# Patient Record
Sex: FEMALE | NOT HISPANIC OR LATINO
[De-identification: names, ages, dates, MRNs, and addresses within clinical notes are randomized per-mention and may not be internally consistent; named-entity substitution may affect disease eponyms.]

---

## 2020-11-03 PROBLEM — Z00.00 ENCOUNTER FOR PREVENTIVE HEALTH EXAMINATION: Status: ACTIVE | Noted: 2020-11-03

## 2020-11-04 ENCOUNTER — APPOINTMENT (OUTPATIENT)
Dept: NEUROLOGY | Facility: CLINIC | Age: 40
End: 2020-11-04
Payer: COMMERCIAL

## 2020-11-04 VITALS
DIASTOLIC BLOOD PRESSURE: 71 MMHG | WEIGHT: 150 LBS | SYSTOLIC BLOOD PRESSURE: 105 MMHG | HEIGHT: 67 IN | BODY MASS INDEX: 23.54 KG/M2 | TEMPERATURE: 97.6 F | HEART RATE: 90 BPM

## 2020-11-04 DIAGNOSIS — Z80.3 FAMILY HISTORY OF MALIGNANT NEOPLASM OF BREAST: ICD-10-CM

## 2020-11-04 DIAGNOSIS — Z78.9 OTHER SPECIFIED HEALTH STATUS: ICD-10-CM

## 2020-11-04 DIAGNOSIS — Z80.0 FAMILY HISTORY OF MALIGNANT NEOPLASM OF DIGESTIVE ORGANS: ICD-10-CM

## 2020-11-04 PROCEDURE — 99072 ADDL SUPL MATRL&STAF TM PHE: CPT

## 2020-11-04 PROCEDURE — 99204 OFFICE O/P NEW MOD 45 MIN: CPT

## 2020-11-05 PROBLEM — Z80.3 FAMILY HISTORY OF MALIGNANT NEOPLASM OF BREAST: Status: ACTIVE | Noted: 2020-11-05

## 2020-11-05 PROBLEM — Z78.9 CURRENT NON-DRINKER OF ALCOHOL: Status: ACTIVE | Noted: 2020-11-05

## 2020-11-05 PROBLEM — Z80.0 FAMILY HISTORY OF PANCREATIC CANCER: Status: ACTIVE | Noted: 2020-11-05

## 2020-11-05 PROBLEM — Z78.9 NON-SMOKER: Status: ACTIVE | Noted: 2020-11-05

## 2020-11-05 NOTE — HISTORY OF PRESENT ILLNESS
[FreeTextEntry1] : This is a 40-year-old woman who is being seen in neurologic consultation for evaluation of headaches. She has had severe headaches for about 10 years. She describes a typically bilateral discomfort which is most prominent in her for head in between the eyes and the nose. She does have light sensitivity and sound sensitivity. There's no nausea or vomiting. She will notice her neck tightening and spasming sometimes with the headache. There is no aura or warning symptoms. She notes stress, arguing and yelling will trigger her headaches. When the headache starts it can last for hours to the entire day. She has tried Advil, Tylenol, and Aleve. Excedrin works the best for her and she takes 6-8 Excedrin tablets at least per week. She has 3 headaches like this per week. She does not have any excessive amounts of stress in her life. 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 She sleeps well. She does not drink enough water. She drinks one to 2 cups of caffeine.

## 2020-11-05 NOTE — PHYSICAL EXAM
[FreeTextEntry1] : Physical examination \par General: No acute distress, Awake, Alert. \par Fundus: Unable\par Neck: no Carotid bruit. \par Cardiovascular: Normal rate, Regular rhythm, No murmur. \par Chest - clear bilaterally\par \par Mental status \par Awake, alert, and oriented to person, time and place, Normal attention span and concentration, Recent and remote memory intact, Language intact, Fund of knowledge intact. \par Cranial Nerves \par II: VFF \par III, IV, VI: PERRL, EOMI. \par V: Facial sensation is normal B/L. \par VII: Facial strength is normal B/L. \par VIII: Gross hearing is intact. \par IX, X: Palate is midline and elevates symmetrically. \par XI: Trapezius normal strength. \par XII: Tongue midline without atrophy or fasciculations. \par \par Motor exam \par Muscle tone - no evidence of rigidity or resistance in all 4 extremities. \par No atrophy or fasciculations \par Muscle Strength: arms and legs, proximal and distal flexors and extensors are normal \par No UE drift.\par \par Reflexes \par All present, normal, and symmetrical. \par Plantars right: mute. \par Plantars left: mute. \par \par Coordination \par Finger to nose: Normal. \par Heel to shin: Normal. \par \par Sensory \par Intact sensation to vibration and cold.\par \par Gait \par Normal\par \par positive trapezius muscle spasm bilaterally.\par

## 2020-11-05 NOTE — ASSESSMENT
[FreeTextEntry1] : Neurologic examination is normal. Still with this long history of headaches and worsening headaches she needs imaging as outlined below.\par She'll keep a headache diary.\par Encouraged intake of water to 64 ounces daily.\par She will begin Cymbalta 30 mg daily for one week with increased to 60 mg daily.\par Followup in 10 weeks.

## 2020-11-27 ENCOUNTER — RESULT REVIEW (OUTPATIENT)
Age: 40
End: 2020-11-27

## 2020-12-14 ENCOUNTER — NON-APPOINTMENT (OUTPATIENT)
Age: 40
End: 2020-12-14

## 2021-01-11 ENCOUNTER — APPOINTMENT (OUTPATIENT)
Dept: NEUROLOGY | Facility: CLINIC | Age: 41
End: 2021-01-11
Payer: COMMERCIAL

## 2021-01-11 VITALS
HEART RATE: 78 BPM | WEIGHT: 154 LBS | SYSTOLIC BLOOD PRESSURE: 123 MMHG | DIASTOLIC BLOOD PRESSURE: 83 MMHG | TEMPERATURE: 97.2 F | HEIGHT: 67 IN | BODY MASS INDEX: 24.17 KG/M2

## 2021-01-11 DIAGNOSIS — G44.40 DRUG-INDUCED HEADACHE, NOT ELSEWHERE CLASSIFIED, NOT INTRACTABLE: ICD-10-CM

## 2021-01-11 PROCEDURE — 99214 OFFICE O/P EST MOD 30 MIN: CPT

## 2021-01-11 PROCEDURE — 99072 ADDL SUPL MATRL&STAF TM PHE: CPT

## 2021-01-12 PROBLEM — G44.40 MEDICATION OVERUSE HEADACHE: Status: ACTIVE | Noted: 2020-11-04

## 2021-01-12 RX ORDER — NORGESTIMATE AND ETHINYL ESTRADIOL 7DAYSX3 28
0.18/0.215/0.25 KIT ORAL
Qty: 28 | Refills: 0 | Status: ACTIVE | COMMUNITY
Start: 2020-12-17

## 2021-01-12 NOTE — PHYSICAL EXAM
[FreeTextEntry1] : Physical examination \par General: No acute distress, Awake, Alert. \par Cardiovascular: Normal rate, Regular rhythm, No murmur. \par Chest - clear bilaterally\par \par Mental status \par Awake, alert, and oriented to person, time and place, Normal attention span and concentration, Recent and remote memory intact, Language intact, Fund of knowledge intact. \par Cranial Nerves \par II: VFF \par III, IV, VI: PERRL, EOMI. \par V: Facial sensation is normal B/L. \par VII: Facial strength is normal B/L. \par VIII: Gross hearing is intact. \par IX, X: Palate is midline and elevates symmetrically. \par XI: Trapezius normal strength. \par XII: Tongue midline without atrophy or fasciculations. \par \par Motor exam \par Muscle tone - no evidence of rigidity or resistance in all 4 extremities. \par No atrophy or fasciculations \par Muscle Strength: arms and legs, proximal and distal flexors and extensors are normal \par No UE drift.\par \par Coordination \par Finger to nose: Normal. \par Heel to shin: Normal. \par \par Gait \par Normal\par \par positive trapezius muscle spasm bilaterally.\par

## 2021-01-12 NOTE — HISTORY OF PRESENT ILLNESS
[FreeTextEntry1] : 1/11/21\par Patient continues to have headaches. She has not been consistently taking her Cymbalta. She says that when she started taking Cymbalta she was missing a lot of doses and because she wasn't improving and still haven't her leg etc. and she thought it wasn't working. However she has not really given it a full chance.\hans Continues to have tightening and spasming of her neck with the headaches. Stressful trigger her headaches. She feels she is taking about 2 etc. his per week but before she had noted 6-8 Excedrin tablets per week.\hans Notes a lot of headaches around her periods. She is on oral contraceptives.\par \par 11/4/2020\par This is a 40-year-old woman who is being seen in neurologic consultation for evaluation of headaches. She has had severe headaches for about 10 years. She describes a typically bilateral discomfort which is most prominent in her for head in between the eyes and the nose. She does have light sensitivity and sound sensitivity. There's no nausea or vomiting. She will notice her neck tightening and spasming sometimes with the headache. There is no aura or warning symptoms. She notes stress, arguing and yelling will trigger her headaches. When the headache starts it can last for hours to the entire day. She has tried Advil, Tylenol, and Aleve. Excedrin works the best for her and she takes 6-8 Excedrin tablets at least per week. She has 3 headaches like this per week. She does not have any excessive amounts of stress in her life. 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 She sleeps well. She does not drink enough water. She drinks one to 2 cups of caffeine.

## 2021-04-29 ENCOUNTER — APPOINTMENT (OUTPATIENT)
Dept: NEUROLOGY | Facility: CLINIC | Age: 41
End: 2021-04-29
Payer: COMMERCIAL

## 2021-04-29 VITALS
BODY MASS INDEX: 22.76 KG/M2 | HEIGHT: 67 IN | SYSTOLIC BLOOD PRESSURE: 116 MMHG | TEMPERATURE: 97.2 F | DIASTOLIC BLOOD PRESSURE: 79 MMHG | WEIGHT: 145 LBS | HEART RATE: 91 BPM

## 2021-04-29 DIAGNOSIS — G44.221 CHRONIC TENSION-TYPE HEADACHE, INTRACTABLE: ICD-10-CM

## 2021-04-29 PROCEDURE — 99072 ADDL SUPL MATRL&STAF TM PHE: CPT

## 2021-04-29 PROCEDURE — 99213 OFFICE O/P EST LOW 20 MIN: CPT

## 2021-04-29 RX ORDER — DULOXETINE HYDROCHLORIDE 60 MG/1
60 CAPSULE, DELAYED RELEASE PELLETS ORAL
Qty: 30 | Refills: 3 | Status: ACTIVE | COMMUNITY
Start: 2020-11-05 | End: 1900-01-01

## 2021-04-29 NOTE — PHYSICAL EXAM
[FreeTextEntry1] : Physical examination \par General: No acute distress, Awake, Alert. \par Cardiovascular: Normal rate, Regular rhythm, No murmur. \par Chest - clear bilaterally\par \par Mental status \par Awake, alert, and oriented to person, time and place, Normal attention span and concentration, Recent and remote memory intact, Language intact, Fund of knowledge intact. \par Cranial Nerves \par II: VFF \par III, IV, VI: PERRL, EOMI. \par V: Facial sensation is normal B/L. \par VII: Facial strength is normal B/L. \par VIII: Gross hearing is intact. \par IX, X: Palate is midline and elevates symmetrically. \par XI: Trapezius normal strength. \par XII: Tongue midline without atrophy or fasciculations. \par \par Motor exam \par Muscle tone - no evidence of rigidity or resistance in all 4 extremities. \par No atrophy or fasciculations \par Muscle Strength: arms and legs, proximal and distal flexors and extensors are normal \par No UE drift.\par \par Gait \par Normal\par \par positive trapezius muscle spasm bilaterally.\par

## 2021-04-29 NOTE — ASSESSMENT
[FreeTextEntry1] : Kathy notes significant improvement in her headaches.  The last headache she had was about 2 weeks ago.  This headache occurred after she received her Covid vaccination.  She used Excedrin tension headache and this resolved.  She is noticing improvement in her muscle spasms and neck pain.  She still has stiffness in her neck on occasion.  No focal or lateralizing neurologic discomfort.  She is due for her second vaccine next week.  She is tolerating Cymbalta without any side effects.  She is compliant with it now.\par \par She is going to discuss IUD with her gynecologist- is not taking oral contraceptive at present.\par \par f/u in 4 months.

## 2021-04-29 NOTE — HISTORY OF PRESENT ILLNESS
[FreeTextEntry1] : 4/29/21\par Kathy notes significant improvement in her headaches.  The last headache she had was about 2 weeks ago.  This headache occurred after she received her Covid vaccination.  She used Excedrin tension headache and this resolved.  She is noticing improvement in her muscle spasms and neck pain.  She still has stiffness in her neck on occasion.  No focal or lateralizing neurologic discomfort.  She is due for her second vaccine next week.  She is tolerating Cymbalta without any side effects.  She is compliant with it now.\par \par 1/11/21\par Patient continues to have headaches. She has not been consistently taking her Cymbalta. She says that when she started taking Cymbalta she was missing a lot of doses and because she wasn't improving and still haven't her leg etc. and she thought it wasn't working. However she has not really given it a full chance.\hans Continues to have tightening and spasming of her neck with the headaches. Stressful trigger her headaches. She feels she is taking about 2 etc. his per week but before she had noted 6-8 Excedrin tablets per week.\par Notes a lot of headaches around her periods. She is on oral contraceptives.\par \par 11/4/2020\par This is a 40-year-old woman who is being seen in neurologic consultation for evaluation of headaches. She has had severe headaches for about 10 years. She describes a typically bilateral discomfort which is most prominent in her for head in between the eyes and the nose. She does have light sensitivity and sound sensitivity. There's no nausea or vomiting. She will notice her neck tightening and spasming sometimes with the headache. There is no aura or warning symptoms. She notes stress, arguing and yelling will trigger her headaches. When the headache starts it can last for hours to the entire day. She has tried Advil, Tylenol, and Aleve. Excedrin works the best for her and she takes 6-8 Excedrin tablets at least per week. She has 3 headaches like this per week. She does not have any excessive amounts of stress in her life. 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 She sleeps well. She does not drink enough water. She drinks one to 2 cups of caffeine.

## 2021-04-29 NOTE — DATA REVIEWED
[de-identified] : \par  [place] MRI Report             Final\par \par No Documents Attached\par \par \par \par \par   [facility]\par                                          [address]\par                                    [place], [place]  [address]\par                                        Department of Radiology\par                                             [phone]\par \par \par Patient Name:      BROCK FERNÁNDEZ                  Location:       [facility]\par Med Rec #:        [id number]                    Account #:      [id number]\par YOB: 1980                    Ordering:       Heather Aponte MD\par Age: 40               Sex:    F                 Attending:      Heather Aponte MD\par PCP:        NO PRIMARY CARE PHYSICIAN\par ______________________________________________________________________________________\par \par Exam Date:      11/27/20\par Exam:         MRI BRAIN\par Order#:       [id number]\par \par \par \par EXAM: MRI brain without contrast\par \par  INDICATION: Headache of long duration\par \par  TECHNIQUE: MR examination of brain performed without contrast utilizing axial diffusion-\par weighted/ADC, axial T2 FLAIR, sagittal T2 FLAIR, coronal T2 FLAIR, axial T1, sagittal T1, coronal\par T1, axial susceptibility weighted sequences.\par \par  COMPARISON: None available\par \par  FINDINGS:\par \par  Ventricles are normal in size and configuration for patient's age. No hydrocephalus or extra-axial\par fluid collection.\par \par  No abnormal restricted diffusion identified to suggest acute territorial infarction. No acute\par intracranial hemorrhage. Few scattered foci of subcortical white matter T2 hyperintense signal are\par noted which appear to have a frontal lobe predilection and are nonspecific in this age demographic\par and may reflect sequela of chronic migraine versus sequela minimal chronic microvascular ischemia\par versus gliosis of other etiologies. No significant midline shift, mass effect or herniation.\par Susceptibility weighted sequences are within normal limits without evidence for chronic blood\par products.\par \par  Visualized proximal arterial and dural venous sinus flow voids preserved on spin-echo sequences.\par Polypoid mucosal retention cyst present within right maxillary sinus. Mild mucosal thickening of the\par anterior ethmoid air cells. Mastoid air cells are well aerated.\par \par  No suspicious expansile or destructive calvarial lesion identified.\par \par  Sella and suprasellar region are unremarkable\par \par \par \par  IMPRESSION:\par \par  No acute intracranial hemorrhage, acute infarction, extra-axial fluid collection or hydrocephalus.\par \par  Few scattered foci of subcortical white matter T2 hyperintense signal are noted which appear to\par have a frontal lobe predilection and are nonspecific in this age demographic and may reflect sequela\par of chronic migraine versus sequela of pminimal chronic microvascular ischemia versus gliosis of\par other etiologies.\par \par \par \par \par \par ***Electronically Signed ***\par -----------------------------------------------\par Gordon Unger MD              11/27/20 1149\par \par Dictated on 11/27/20\par \par \par Report cc:  Heather Aponte MD;\par \par  \par \par  Ordered by: HEATHER APONTE       Collected/Examined: 27Nov2020 10:18AM       \par Verified by: HEATHER APONTE 30Nov2020 12:09PM       \par  Result Communication: No patient communication needed at this time;\par Stage: Final       \par  Performed at: [facility]       Resulted: 27Nov2020 11:10AM       Last Updated: 30Nov2020 12:09PM       Accession: [id number]

## 2021-08-26 ENCOUNTER — APPOINTMENT (OUTPATIENT)
Dept: NEUROLOGY | Facility: CLINIC | Age: 41
End: 2021-08-26

## 2022-10-17 NOTE — DATA REVIEWED
[de-identified] : \par  [place] MRI Report             Final\par \par No Documents Attached\par \par \par \par \par   [facility]\par                                          [address]\par                                    [place], [place]  [address]\par                                        Department of Radiology\par                                             [phone]\par \par \par Patient Name:      BROCK FERNÁNDEZ                  Location:       [facility]\par Med Rec #:        [id number]                    Account #:      [id number]\par YOB: 1980                    Ordering:       Heather Aponte MD\par Age: 40               Sex:    F                 Attending:      Heather Aponte MD\par PCP:        NO PRIMARY CARE PHYSICIAN\par ______________________________________________________________________________________\par \par Exam Date:      11/27/20\par Exam:         MRI BRAIN\par Order#:       MRI [id number]\par \par \par \par EXAM: MRI brain without contrast\par \par  INDICATION: Headache of long duration\par \par  TECHNIQUE: MR examination of brain performed without contrast utilizing axial diffusion-\par weighted/ADC, axial T2 FLAIR, sagittal T2 FLAIR, coronal T2 FLAIR, axial T1, sagittal T1, coronal\par T1, axial susceptibility weighted sequences.\par \par  COMPARISON: None available\par \par  FINDINGS:\par \par  Ventricles are normal in size and configuration for patient's age. No hydrocephalus or extra-axial\par fluid collection.\par \par  No abnormal restricted diffusion identified to suggest acute territorial infarction. No acute\par intracranial hemorrhage. Few scattered foci of subcortical white matter T2 hyperintense signal are\par noted which appear to have a frontal lobe predilection and are nonspecific in this age demographic\par and may reflect sequela of chronic migraine versus sequela minimal chronic microvascular ischemia\par versus gliosis of other etiologies. No significant midline shift, mass effect or herniation.\par Susceptibility weighted sequences are within normal limits without evidence for chronic blood\par products.\par \par  Visualized proximal arterial and dural venous sinus flow voids preserved on spin-echo sequences.\par Polypoid mucosal retention cyst present within right maxillary sinus. Mild mucosal thickening of the\par anterior ethmoid air cells. Mastoid air cells are well aerated.\par \par  No suspicious expansile or destructive calvarial lesion identified.\par \par  Sella and suprasellar region are unremarkable\par \par \par \par  IMPRESSION:\par \par  No acute intracranial hemorrhage, acute infarction, extra-axial fluid collection or hydrocephalus.\par \par  Few scattered foci of subcortical white matter T2 hyperintense signal are noted which appear to\par have a frontal lobe predilection and are nonspecific in this age demographic and may reflect sequela\par of chronic migraine versus sequela of pminimal chronic microvascular ischemia versus gliosis of\par other etiologies.\par \par \par \par \par \par ***Electronically Signed ***\par -----------------------------------------------\par Gordon Ungre MD              11/27/20 1149\par \par Dictated on 11/27/20\par \par \par Report cc:  Heather Aponte MD;\par \par  \par \par  Ordered by: HEATHER APONTE       Collected/Examined: 27Nov2020 10:18AM       \par Verified by: HEATHER APONTE 30Nov2020 12:09PM       \par  Result Communication: No patient communication needed at this time;\par Stage: Final       \par  Performed at: [facility]       Resulted: 27Nov2020 11:10AM       Last Updated: 30Nov2020 12:09PM       Accession: [id number]        No